# Patient Record
Sex: FEMALE | URBAN - METROPOLITAN AREA
[De-identification: names, ages, dates, MRNs, and addresses within clinical notes are randomized per-mention and may not be internally consistent; named-entity substitution may affect disease eponyms.]

---

## 2019-01-14 ENCOUNTER — APPOINTMENT (RX ONLY)
Dept: URBAN - METROPOLITAN AREA CLINIC 146 | Facility: CLINIC | Age: 64
Setting detail: DERMATOLOGY
End: 2019-01-14

## 2019-01-14 DIAGNOSIS — L29.8 OTHER PRURITUS: ICD-10-CM

## 2019-01-14 DIAGNOSIS — L29.89 OTHER PRURITUS: ICD-10-CM

## 2019-01-14 DIAGNOSIS — L65.9 NONSCARRING HAIR LOSS, UNSPECIFIED: ICD-10-CM

## 2019-01-14 PROBLEM — F41.9 ANXIETY DISORDER, UNSPECIFIED: Status: ACTIVE | Noted: 2019-01-14

## 2019-01-14 PROBLEM — L85.3 XEROSIS CUTIS: Status: ACTIVE | Noted: 2019-01-14

## 2019-01-14 PROBLEM — I10 ESSENTIAL (PRIMARY) HYPERTENSION: Status: ACTIVE | Noted: 2019-01-14

## 2019-01-14 PROBLEM — E03.9 HYPOTHYROIDISM, UNSPECIFIED: Status: ACTIVE | Noted: 2019-01-14

## 2019-01-14 PROBLEM — L20.84 INTRINSIC (ALLERGIC) ECZEMA: Status: ACTIVE | Noted: 2019-01-14

## 2019-01-14 PROBLEM — J30.1 ALLERGIC RHINITIS DUE TO POLLEN: Status: ACTIVE | Noted: 2019-01-14

## 2019-01-14 PROBLEM — J45.909 UNSPECIFIED ASTHMA, UNCOMPLICATED: Status: ACTIVE | Noted: 2019-01-14

## 2019-01-14 PROBLEM — E78.5 HYPERLIPIDEMIA, UNSPECIFIED: Status: ACTIVE | Noted: 2019-01-14

## 2019-01-14 PROBLEM — E13.9 OTHER SPECIFIED DIABETES MELLITUS WITHOUT COMPLICATIONS: Status: ACTIVE | Noted: 2019-01-14

## 2019-01-14 PROCEDURE — 99213 OFFICE O/P EST LOW 20 MIN: CPT

## 2019-01-14 PROCEDURE — ? PRESCRIPTION

## 2019-01-14 PROCEDURE — ? COUNSELING

## 2019-01-14 RX ORDER — EMOLLIENT COMBINATION NO.53
FOAM (GRAM) TOPICAL
Qty: 3 | Refills: 0 | Status: ERX | COMMUNITY
Start: 2019-01-14

## 2019-01-14 RX ORDER — DESOXIMETASONE 2.5 MG/ML
SPRAY TOPICAL QD
Qty: 3 | Refills: 0 | Status: ERX | COMMUNITY
Start: 2019-01-14

## 2019-01-14 RX ADMIN — DESOXIMETASONE: 2.5 SPRAY TOPICAL at 00:00

## 2019-01-14 RX ADMIN — Medication: at 17:05

## 2019-01-14 ASSESSMENT — LOCATION DETAILED DESCRIPTION DERM
LOCATION DETAILED: RIGHT SUPERIOR OCCIPITAL SCALP
LOCATION DETAILED: RIGHT MEDIAL FRONTAL SCALP

## 2019-01-14 ASSESSMENT — LOCATION ZONE DERM: LOCATION ZONE: SCALP

## 2019-01-14 ASSESSMENT — LOCATION SIMPLE DESCRIPTION DERM
LOCATION SIMPLE: RIGHT SCALP
LOCATION SIMPLE: RIGHT OCCIPITAL SCALP

## 2019-01-14 NOTE — PROCEDURE: COUNSELING
Patient Specific Counseling (Will Not Stick From Patient To Patient): She has thinning at the hairline and the crown. Sporadic broken hairs. Her thyroid levels are monitored Q 8 weeks and are normal. Medications were reviewed and are negative for hair loss. Trial with Topicort spray .25% QD. RTC in 2 months for follow up.
Detail Level: Zone

## 2019-01-18 RX ORDER — DESOXIMETASONE 2.5 MG/ML
SPRAY TOPICAL QD
Qty: 3 | Refills: 0 | Status: ERX

## 2019-01-18 RX ORDER — EMOLLIENT COMBINATION NO.53
FOAM (GRAM) TOPICAL
Qty: 3 | Refills: 0 | Status: ERX

## 2019-10-04 ENCOUNTER — APPOINTMENT (RX ONLY)
Dept: URBAN - METROPOLITAN AREA CLINIC 146 | Facility: CLINIC | Age: 64
Setting detail: DERMATOLOGY
End: 2019-10-04

## 2019-10-04 DIAGNOSIS — L82.0 INFLAMED SEBORRHEIC KERATOSIS: ICD-10-CM

## 2019-10-04 DIAGNOSIS — L82.1 OTHER SEBORRHEIC KERATOSIS: ICD-10-CM

## 2019-10-04 PROCEDURE — 99214 OFFICE O/P EST MOD 30 MIN: CPT | Mod: 25

## 2019-10-04 PROCEDURE — 17110 DESTRUCTION B9 LES UP TO 14: CPT

## 2019-10-04 PROCEDURE — ? COUNSELING

## 2019-10-04 PROCEDURE — ? LIQUID NITROGEN

## 2019-10-04 ASSESSMENT — LOCATION SIMPLE DESCRIPTION DERM
LOCATION SIMPLE: GROIN
LOCATION SIMPLE: LEFT POSTERIOR AXILLA
LOCATION SIMPLE: LEFT THIGH
LOCATION SIMPLE: RIGHT POSTERIOR AXILLA
LOCATION SIMPLE: RIGHT CHEEK

## 2019-10-04 ASSESSMENT — LOCATION ZONE DERM
LOCATION ZONE: AXILLAE
LOCATION ZONE: FACE
LOCATION ZONE: LEG
LOCATION ZONE: TRUNK

## 2019-10-04 ASSESSMENT — LOCATION DETAILED DESCRIPTION DERM
LOCATION DETAILED: LEFT ANTERIOR PROXIMAL THIGH
LOCATION DETAILED: RIGHT SUPERIOR CENTRAL MALAR CHEEK
LOCATION DETAILED: LEFT POSTERIOR AXILLA
LOCATION DETAILED: RIGHT SUPRAPUBIC SKIN
LOCATION DETAILED: RIGHT POSTERIOR AXILLA

## 2019-10-04 NOTE — PROCEDURE: LIQUID NITROGEN
Render Note In Bullet Format When Appropriate: No
Medical Necessity Clause: This procedure was medically necessary because the lesions that were treated were:
Consent: The patient's consent was obtained including but not limited to risks of crusting, scabbing, blistering, scarring, darker or lighter pigmentary change, recurrence, incomplete removal and infection.
Post-Care Instructions: I reviewed with the patient in detail post-care instructions. Patient is to wear sunprotection, and avoid picking at any of the treated lesions. Pt may apply Vaseline to crusted or scabbing areas.
Medical Necessity Information: It is in your best interest to select a reason for this procedure from the list below. All of these items fulfill various CMS LCD requirements except the new and changing color options.
Detail Level: Detailed

## 2019-10-04 NOTE — PROCEDURE: MIPS QUALITY
Quality 226: Preventive Care And Screening: Tobacco Use: Screening And Cessation Intervention: Patient screened for tobacco use and is an ex/non-smoker
Quality 154 Part A: Falls: Risk Assessment (Should Be Reported With Measure 155.): Falls risk assessment completed and documented in the past 12 months.
Quality 402: Tobacco Use And Help With Quitting Among Adolescents: Patient screened for tobacco and is an ex-smoker
Quality 47: Advance Care Plan: Advance care planning not documented, reason not otherwise specified.
Quality 110: Preventive Care And Screening: Influenza Immunization: Influenza immunization was not ordered or administered, reason not given
Quality 154 Part B: Falls: Risk Screening (Should Be Reported With Measure 155.): Patient screened for future fall risk; documentation of no falls in the past year or only one fall without injury in the past year
Detail Level: Generalized
Quality 131: Pain Assessment And Follow-Up: Pain assessment using a standardized tool is documented as negative, no follow-up plan required
Quality 431: Preventive Care And Screening: Unhealthy Alcohol Use - Screening: Patient screened for unhealthy alcohol use using a single question and scores less than 2 times per year
Quality 111:Pneumonia Vaccination Status For Older Adults: Pneumococcal Vaccination not Administered or Previously Received, Reason not Otherwise Specified

## 2021-02-22 ENCOUNTER — IMPORTED ENCOUNTER (OUTPATIENT)
Dept: URBAN - NONMETROPOLITAN AREA CLINIC 1 | Facility: CLINIC | Age: 66
End: 2021-02-22

## 2021-02-22 PROBLEM — H16.223: Noted: 2021-02-22

## 2021-02-22 PROBLEM — H26.493: Noted: 2021-02-22

## 2021-02-22 PROBLEM — H40.1134: Noted: 2021-02-22

## 2021-02-22 PROBLEM — H10.423: Noted: 2021-02-22

## 2021-02-22 PROBLEM — Z96.1: Noted: 2021-02-22

## 2021-02-22 PROCEDURE — 99203 OFFICE O/P NEW LOW 30 MIN: CPT

## 2021-02-22 NOTE — PATIENT DISCUSSION
*Patient just moved from [place] ; previously followed by [employer]. Glaucoma OU - Previously treated/followed by [facility] & [facility] - IOP stable today: 13 OU- Cup to Disc: 0.3 OD and 0.5 OS- Patient on Latanoprost QHS OU- Continue Latanoprost QHS OUPseudophakia OU w/ PCO- Intraocular lenses are stable and in place - Discussed signs and symptoms associated w/ PCO progression- Discussed that I think the PCO is contributing to her decreased vision OSOcular Allergies / DARIA OU - Discussed diagnosis in detail w/ patient - Discussed signs and symptoms associated - Recommend Pataday QD OU; patient states this is too expensive. - Patient wants Rx for allergy drop sent to [facility].  - Start Pazeo QD OU Rx sent to pharmacy- Hx of Punctal Plugs OU- Continue Refresh PRN OU

## 2021-03-08 ENCOUNTER — IMPORTED ENCOUNTER (OUTPATIENT)
Dept: URBAN - NONMETROPOLITAN AREA CLINIC 1 | Facility: CLINIC | Age: 66
End: 2021-03-08

## 2021-03-08 PROBLEM — H16.223: Noted: 2021-02-22

## 2021-03-08 PROBLEM — H10.423: Noted: 2021-02-22

## 2021-03-08 PROBLEM — Z96.1: Noted: 2021-03-08

## 2021-03-08 PROBLEM — H40.1134: Noted: 2021-03-08

## 2021-03-08 PROBLEM — H26.492: Noted: 2021-03-08

## 2021-03-08 PROBLEM — Z98.890: Noted: 2021-03-15

## 2021-03-08 PROCEDURE — 66821 AFTER CATARACT LASER SURGERY: CPT

## 2021-03-08 NOTE — PATIENT DISCUSSION
*Patient just moved from [place] ; previously followed by [facility] [employer]. Pseudophakia OU w/ PCO- Intraocular lenses are stable and in place - Discussed signs and symptoms associated w/ PCO progression- Discussed that I think the PCO is contributing to her decreased vision OS- Explained PCO and RBAs of YAG Capsulotomy to pt. - Pt elects to proceed. YAG Caps OS today Ocular Allergies / DARIA OU - Discussed diagnosis in detail w/ patient - Discussed signs and symptoms associated - Recommend Pataday QD OU; patient states this is too expensive. - Patient wants Rx for allergy drop sent to [facility].  - Start Pazeo QD OU Rx sent to pharmacy- Hx of Punctal Plugs OU- Continue Refresh PRN OU Glaucoma OU - Previously treated/followed by [facility] - IOP stable today: 12 OU- Cup to Disc: 0.3 OD and 0.5 OS- Continue Latanoprost QHS OU

## 2021-03-15 ENCOUNTER — IMPORTED ENCOUNTER (OUTPATIENT)
Dept: URBAN - NONMETROPOLITAN AREA CLINIC 1 | Facility: CLINIC | Age: 66
End: 2021-03-15

## 2021-03-15 PROCEDURE — 99024 POSTOP FOLLOW-UP VISIT: CPT

## 2021-03-15 PROCEDURE — 92015 DETERMINE REFRACTIVE STATE: CPT

## 2021-03-15 NOTE — PATIENT DISCUSSION
*Patient just moved from [place] ; previously followed by [employer]. Pseudophakia OU - Intraocular lenses are stable and in place - s/p YAG PC OS Ocular Allergies / DARIA OU - Discussed diagnosis in detail w/ patient - Discussed signs and symptoms associated - Recommend Pataday QD OU; patient states this is too expensive. - Patient wants Rx for allergy drop sent to [facility].  - Start Pazeo QD OU Rx sent to pharmacy- Hx of Punctal Plugs OU- Continue Refresh PRN OU Glaucoma OU - Previously treated/followed by [facility] - IOP stable today: 12 OU- Cup to Disc: 0.3 OD and 0.5 OS- Continue Latanoprost QHS OU- RTC in 4 months for follow up Presbyopia OU - New spectacle Rx issued

## 2021-07-12 ENCOUNTER — IMPORTED ENCOUNTER (OUTPATIENT)
Dept: URBAN - NONMETROPOLITAN AREA CLINIC 1 | Facility: CLINIC | Age: 66
End: 2021-07-12

## 2021-07-12 PROBLEM — H10.423: Noted: 2021-07-12

## 2021-07-12 PROBLEM — Z96.1: Noted: 2021-07-12

## 2021-07-12 PROBLEM — H40.1134: Noted: 2021-07-12

## 2021-07-12 PROBLEM — H16.223: Noted: 2021-07-12

## 2021-07-12 PROCEDURE — 99213 OFFICE O/P EST LOW 20 MIN: CPT

## 2021-07-12 NOTE — PATIENT DISCUSSION
*Patient just moved from [place] ; previously followed by [facility] [employer]. Pseudophakia OU - Intraocular lenses are stable and in place - s/p YAG PC OS Ocular Allergies / DARIA OU - Discussed diagnosis in detail w/ patient - Discussed signs and symptoms associated - Recommend Pataday QD OU; patient states this is too expensive. - Patient wants Rx for allergy drop sent to [facility].  - Continue to Olopatadine BID OU Rx sent to pharmacy- Hx of Punctal Plugs OU- Continue Refresh PRN OU Glaucoma OU - Previously treated/followed by [facility] - IOP stable today: 14 OU- Cup to Disc: 0.3 OD and 0.5 OS- Continue Latanoprost QHS OU- RTC in 4 months for follow up with VF Presbyopia OU - Stable at this time

## 2021-10-13 NOTE — PATIENT DISCUSSION
Discussed SLT (last SLT 2011), GCC suspect for changes, VF stable today ring scotoma inferior OU. Continue to monitor consider q6mo.

## 2021-11-08 ENCOUNTER — PREPPED CHART (OUTPATIENT)
Dept: RURAL CLINIC 1 | Facility: CLINIC | Age: 66
End: 2021-11-08

## 2021-11-08 ENCOUNTER — IMPORTED ENCOUNTER (OUTPATIENT)
Dept: URBAN - NONMETROPOLITAN AREA CLINIC 1 | Facility: CLINIC | Age: 66
End: 2021-11-08

## 2021-11-08 PROCEDURE — 92012 INTRM OPH EXAM EST PATIENT: CPT

## 2021-11-08 NOTE — PATIENT DISCUSSION
*Patient just moved from [place] ; previously followed by [facility] [employer]. Pseudophakia OU - Intraocular lenses are stable and in place - s/p YAG PC OS Ocular Allergies / DARIA OU - Discussed diagnosis in detail w/ patient - Discussed signs and symptoms associated - Recommend Pataday QD OU; patient states this is too expensive. - Patient wants Rx for allergy drop sent to [facility].  - Continue to Olopatadine BID OU Rx sent to pharmacy- Hx of Punctal Plugs OU- Continue Refresh PRN OU Glaucoma OU - Previously treated/followed by [facility] - IOP stable today:14/15 stable - Continue Latanoprost QHS OU- RTC in 1months for follow up with VF

## 2021-11-08 NOTE — PATIENT DISCUSSION
Patient just moved from [place]. prev followed by [employer]. Recommend Pataday q d ou, patient states this is too expensive. Cont. Olopatadine bid OU. Hx of punctal plugs OU. cont Refresh prn OU. [address] treated/followed by [facility] - IOP stable today: 14/15 stable- cont. Latanoprost q hs OU.

## 2022-01-21 ASSESSMENT — VISUAL ACUITY
OD_SC: 20/25+2
OS_SC: 20/25

## 2022-01-21 ASSESSMENT — TONOMETRY
OS_IOP_MMHG: 15
OD_IOP_MMHG: 14

## 2022-02-03 ENCOUNTER — COMPREHENSIVE EXAM (OUTPATIENT)
Dept: RURAL CLINIC 1 | Facility: CLINIC | Age: 67
End: 2022-02-03

## 2022-02-03 DIAGNOSIS — H26.491: ICD-10-CM

## 2022-02-03 DIAGNOSIS — Z96.1: ICD-10-CM

## 2022-02-03 DIAGNOSIS — H40.1131: ICD-10-CM

## 2022-02-03 DIAGNOSIS — H16.223: ICD-10-CM

## 2022-02-03 PROCEDURE — 92083 EXTENDED VISUAL FIELD XM: CPT

## 2022-02-03 PROCEDURE — 92012 INTRM OPH EXAM EST PATIENT: CPT

## 2022-02-03 ASSESSMENT — VISUAL ACUITY
OS_CC: 20/20-1
OD_CC: 20/20-2

## 2022-02-03 ASSESSMENT — TONOMETRY
OD_IOP_MMHG: 14
OS_IOP_MMHG: 14

## 2022-02-03 NOTE — PATIENT DISCUSSION
Patient just moved from [place]. prev followed by [employer]. Recommend Pataday q d ou, patient states this is too expensive. Cont. Olopatadine bid OU. Hx of punctal plugs OU. cont Refresh prn OU. [address] treated/followed by [facility] - IOP stable today: 14/15 stable- cont. Latanoprost q hs OU.

## 2022-03-03 NOTE — PATIENT DISCUSSION
Continue to monitor for changes, look for old chart and find Tmax and IOP after SLT (thinks ~14 post-operatively) consider doing SLT again if IOP continues to increase.

## 2022-04-09 ASSESSMENT — VISUAL ACUITY
OD_SC: 20/20
OS_CC: 20/25
OD_SC: 20/25
OS_SC: 20/40
OD_SC: 20/30+2
OS_SC: 20/40
OU_CC: J1+
OS_SC: 20/25
OS_GLARE: 20/70
OD_CC: 20/25+2
OS_SC: 20/40
OD_SC: 20/25
OS_PH: 20/30

## 2022-04-09 ASSESSMENT — TONOMETRY
OD_IOP_MMHG: 12
OD_IOP_MMHG: 14
OS_IOP_MMHG: 12
OS_IOP_MMHG: 15
OD_IOP_MMHG: 13
OD_IOP_MMHG: 13
OS_IOP_MMHG: 13
OS_IOP_MMHG: 13
OS_IOP_MMHG: 14
OD_IOP_MMHG: 14

## 2022-06-09 ENCOUNTER — ESTABLISHED PATIENT (OUTPATIENT)
Dept: RURAL CLINIC 1 | Facility: CLINIC | Age: 67
End: 2022-06-09

## 2022-06-09 DIAGNOSIS — H40.1131: ICD-10-CM

## 2022-06-09 PROCEDURE — 92012 INTRM OPH EXAM EST PATIENT: CPT

## 2022-06-09 ASSESSMENT — VISUAL ACUITY
OD_SC: 20/20-1
OU_SC: 20/30
OS_SC: 20/20-1
OS_CC: 20/20-2
OD_CC: 20/30-1
OU_CC: 20/30-2

## 2022-06-09 ASSESSMENT — TONOMETRY
OS_IOP_MMHG: 14
OD_IOP_MMHG: 14

## 2022-06-09 NOTE — PATIENT DISCUSSION
Patient just moved from [place]. prev followed by [employer]. Recommend Pataday q d ou, patient states this is too expensive. Cont. Olopatadine bid OU. Hx of punctal plugs OU. cont Refresh prn OU. [address] treated/followed by [facility] - IOP stable today: 14/15 stable- cont. Latanoprost q hs OU.

## 2022-10-24 ENCOUNTER — ESTABLISHED PATIENT (OUTPATIENT)
Dept: RURAL CLINIC 1 | Facility: CLINIC | Age: 67
End: 2022-10-24

## 2022-10-24 DIAGNOSIS — H40.1131: ICD-10-CM

## 2022-10-24 DIAGNOSIS — H16.223: ICD-10-CM

## 2022-10-24 PROCEDURE — 92083 EXTENDED VISUAL FIELD XM: CPT

## 2022-10-24 PROCEDURE — 92012 INTRM OPH EXAM EST PATIENT: CPT

## 2022-10-24 ASSESSMENT — VISUAL ACUITY
OU_CC: 20/25
OD_CC: 20/20
OD_CC: 20/25
OS_CC: 20/20
OS_CC: 20/25
OU_CC: 20/20

## 2022-10-24 ASSESSMENT — TONOMETRY
OD_IOP_MMHG: 17
OD_IOP_MMHG: 17
OS_IOP_MMHG: 17
OS_IOP_MMHG: 17

## 2022-10-24 NOTE — PATIENT DISCUSSION
Patient just moved from [place]. prev followed by [employer]. Recommend Pataday q d ou, patient states this is too expensive. Cont. Olopatadine bid OU. Hx of punctal plugs OU. cont Refresh prn OU. [address] treated/followed by [facility] - IOP stable today: 14/15 stable- cont. Latanoprost q hs OU.

## 2023-08-28 ENCOUNTER — FOLLOW UP (OUTPATIENT)
Dept: RURAL CLINIC 1 | Facility: CLINIC | Age: 68
End: 2023-08-28

## 2023-08-28 DIAGNOSIS — H52.4: ICD-10-CM

## 2023-08-28 DIAGNOSIS — H40.1131: ICD-10-CM

## 2023-08-28 DIAGNOSIS — H35.372: ICD-10-CM

## 2023-08-28 PROCEDURE — 92083 EXTENDED VISUAL FIELD XM: CPT

## 2023-08-28 PROCEDURE — 99213 OFFICE O/P EST LOW 20 MIN: CPT

## 2023-08-28 PROCEDURE — 92015 DETERMINE REFRACTIVE STATE: CPT

## 2023-08-28 ASSESSMENT — TONOMETRY
OD_IOP_MMHG: 15
OS_IOP_MMHG: 14

## 2023-08-28 ASSESSMENT — VISUAL ACUITY
OD_CC: 20/20
OU_CC: 20/20
OU_CC: 20/20
OS_CC: 20/20-2
OS_CC: 20/20
OD_CC: 20/20

## 2024-02-05 ENCOUNTER — FOLLOW UP (OUTPATIENT)
Dept: RURAL CLINIC 1 | Facility: CLINIC | Age: 69
End: 2024-02-05

## 2024-02-05 DIAGNOSIS — H40.1131: ICD-10-CM

## 2024-02-05 DIAGNOSIS — H35.372: ICD-10-CM

## 2024-02-05 PROCEDURE — 92133 CPTRZD OPH DX IMG PST SGM ON: CPT

## 2024-02-05 PROCEDURE — 99213 OFFICE O/P EST LOW 20 MIN: CPT

## 2024-02-05 ASSESSMENT — VISUAL ACUITY
OS_CC: 20/30-2
OD_CC: 20/25
OU_CC: 20/30

## 2024-02-05 ASSESSMENT — TONOMETRY
OS_IOP_MMHG: 14
OD_IOP_MMHG: 13

## 2024-09-23 ENCOUNTER — COMPREHENSIVE EXAM (OUTPATIENT)
Dept: RURAL CLINIC 1 | Facility: CLINIC | Age: 69
End: 2024-09-23

## 2024-09-23 DIAGNOSIS — H35.372: ICD-10-CM

## 2024-09-23 DIAGNOSIS — H40.1131: ICD-10-CM

## 2024-09-23 PROCEDURE — 92014 COMPRE OPH EXAM EST PT 1/>: CPT

## 2024-09-23 PROCEDURE — 92083 EXTENDED VISUAL FIELD XM: CPT

## 2025-03-31 ENCOUNTER — FOLLOW UP (OUTPATIENT)
Age: 70
End: 2025-03-31